# Patient Record
Sex: FEMALE | Race: WHITE | NOT HISPANIC OR LATINO | Employment: OTHER | URBAN - METROPOLITAN AREA
[De-identification: names, ages, dates, MRNs, and addresses within clinical notes are randomized per-mention and may not be internally consistent; named-entity substitution may affect disease eponyms.]

---

## 2017-05-25 ENCOUNTER — CONVERSION ENCOUNTER (OUTPATIENT)
Dept: FAMILY MEDICINE CLINIC | Facility: CLINIC | Age: 32
End: 2017-05-25

## 2017-08-30 ENCOUNTER — CONVERSION ENCOUNTER (OUTPATIENT)
Dept: FAMILY MEDICINE CLINIC | Facility: CLINIC | Age: 32
End: 2017-08-30

## 2017-09-07 ENCOUNTER — HOSPITAL ENCOUNTER (OUTPATIENT)
Dept: FAMILY MEDICINE CLINIC | Facility: CLINIC | Age: 32
Setting detail: SPECIMEN
Discharge: HOME | End: 2017-09-07
Attending: FAMILY MEDICINE | Admitting: FAMILY MEDICINE

## 2017-09-07 ENCOUNTER — CONVERSION ENCOUNTER (OUTPATIENT)
Dept: FAMILY MEDICINE CLINIC | Facility: CLINIC | Age: 32
End: 2017-09-07

## 2017-09-07 LAB
ANION GAP SERPL CALC-SCNC: 9.9 MMOL/L (ref 10–20)
BACTERIA SPEC AEROBE CULT: NORMAL
BASOPHILS # BLD AUTO: 0.1 10*3/UL (ref 0–0.2)
BASOPHILS NFR BLD AUTO: 1 % (ref 0–2)
BILIRUB UR QL STRIP: NEGATIVE MG/DL
BUN SERPL-MCNC: 9 MG/DL (ref 8–20)
BUN/CREAT SERPL: 18 (ref 5.4–26.2)
CALCIUM SERPL-MCNC: 9.4 MG/DL (ref 8.9–10.3)
CASTS URNS QL MICRO: ABNORMAL /[LPF]
CHLORIDE SERPL-SCNC: 105 MMOL/L (ref 101–111)
CHOLEST SERPL-MCNC: 194 MG/DL
CHOLEST/HDLC SERPL: 3.7 {RATIO}
COLOR UR: YELLOW
CONV BACTERIA IN URINE MICRO: ABNORMAL
CONV CLARITY OF URINE: ABNORMAL
CONV CO2: 27 MMOL/L (ref 22–32)
CONV HYALINE CASTS IN URINE MICRO: 1 /[LPF] (ref 0–5)
CONV LDL CHOLESTEROL DIRECT: 136 MG/DL (ref 0–100)
CONV PROTEIN IN URINE BY AUTOMATED TEST STRIP: 30 MG/DL
CONV SMALL ROUND CELLS: ABNORMAL /[HPF]
CONV UROBILINOGEN IN URINE BY AUTOMATED TEST STRIP: 0.2 MG/DL
CREAT UR-MCNC: 0.5 MG/DL (ref 0.4–1)
CULTURE INDICATED?: ABNORMAL
DIFFERENTIAL METHOD BLD: (no result)
EOSINOPHIL # BLD AUTO: 0.3 10*3/UL (ref 0–0.3)
EOSINOPHIL # BLD AUTO: 5 % (ref 0–3)
ERYTHROCYTE [DISTWIDTH] IN BLOOD BY AUTOMATED COUNT: 13.9 % (ref 11.5–14.5)
GLUCOSE SERPL-MCNC: 100 MG/DL (ref 65–99)
GLUCOSE UR QL: NEGATIVE MG/DL
HCT VFR BLD AUTO: 40.5 % (ref 35–49)
HDLC SERPL-MCNC: 53 MG/DL
HGB BLD-MCNC: 13.6 G/DL (ref 12–15)
HGB UR QL STRIP: ABNORMAL
KETONES UR QL STRIP: ABNORMAL MG/DL
LDLC/HDLC SERPL: 2.6 {RATIO}
LEUKOCYTE ESTERASE UR QL STRIP: ABNORMAL
LIPID INTERPRETATION: ABNORMAL
LYMPHOCYTES # BLD AUTO: 1.5 10*3/UL (ref 0.8–4.8)
LYMPHOCYTES NFR BLD AUTO: 26 % (ref 18–42)
Lab: NORMAL
MCH RBC QN AUTO: 29.7 PG (ref 26–32)
MCHC RBC AUTO-ENTMCNC: 33.4 G/DL (ref 32–36)
MCV RBC AUTO: 88.7 FL (ref 80–94)
MICRO REPORT STATUS: NORMAL
MONOCYTES # BLD AUTO: 0.6 10*3/UL (ref 0.1–1.3)
MONOCYTES NFR BLD AUTO: 10 % (ref 2–11)
NEUTROPHILS # BLD AUTO: 3.4 10*3/UL (ref 2.3–8.6)
NEUTROPHILS NFR BLD AUTO: 58 % (ref 50–75)
NITRITE UR QL STRIP: POSITIVE
NRBC BLD AUTO-RTO: 0 /100{WBCS}
NRBC/RBC NFR BLD MANUAL: 0 10*3/UL
PH UR STRIP.AUTO: 6.5 [PH] (ref 4.5–8)
PLATELET # BLD AUTO: 323 10*3/UL (ref 150–450)
PMV BLD AUTO: 9 FL (ref 7.4–10.4)
POTASSIUM SERPL-SCNC: 3.9 MMOL/L (ref 3.6–5.1)
RBC # BLD AUTO: 4.57 10*6/UL (ref 4–5.4)
RBC #/AREA URNS HPF: 4 /[HPF] (ref 0–3)
SODIUM SERPL-SCNC: 138 MMOL/L (ref 136–144)
SP GR UR: 1.02 (ref 1–1.03)
SPECIMEN SOURCE: NORMAL
SPERM URNS QL MICRO: ABNORMAL /[HPF]
SQUAMOUS SPT QL MICRO: 3 /[HPF] (ref 0–5)
TRIGL SERPL-MCNC: 72 MG/DL
UNIDENT CRYS URNS QL MICRO: ABNORMAL /[HPF]
VLDLC SERPL CALC-MCNC: 5.3 MG/DL
WBC # BLD AUTO: 5.8 10*3/UL (ref 4.5–11.5)
WBC #/AREA URNS HPF: ABNORMAL /[HPF] (ref 0–5)
YEAST SPEC QL WET PREP: ABNORMAL /[HPF]

## 2018-02-05 ENCOUNTER — CONVERSION ENCOUNTER (OUTPATIENT)
Dept: FAMILY MEDICINE CLINIC | Facility: CLINIC | Age: 33
End: 2018-02-05

## 2018-09-16 ENCOUNTER — CONVERSION ENCOUNTER (OUTPATIENT)
Dept: FAMILY MEDICINE CLINIC | Facility: CLINIC | Age: 33
End: 2018-09-16

## 2018-12-20 ENCOUNTER — HOSPITAL ENCOUNTER (OUTPATIENT)
Dept: FAMILY MEDICINE CLINIC | Facility: CLINIC | Age: 33
Setting detail: SPECIMEN
Discharge: HOME | End: 2018-12-20
Attending: FAMILY MEDICINE | Admitting: FAMILY MEDICINE

## 2018-12-20 ENCOUNTER — CONVERSION ENCOUNTER (OUTPATIENT)
Dept: FAMILY MEDICINE CLINIC | Facility: CLINIC | Age: 33
End: 2018-12-20

## 2018-12-20 LAB
AMPICILLIN SUSC ISLT: ABNORMAL
AZTREONAM SUSC ISLT: ABNORMAL
BACTERIA ISLT: ABNORMAL
BACTERIA SPEC AEROBE CULT: ABNORMAL
BILIRUB UR QL STRIP: NEGATIVE MG/DL
CASTS URNS QL MICRO: ABNORMAL /[LPF]
CEFAZOLIN SUSC ISLT: ABNORMAL
CEFEPIME SUSC ISLT: ABNORMAL
CEFTRIAXONE SUSC ISLT: ABNORMAL
CIPROFLOXACIN SUSC ISLT: ABNORMAL
COLONY COUNT: ABNORMAL
COLOR UR: ABNORMAL
CONV BACTERIA IN URINE MICRO: ABNORMAL
CONV CLARITY OF URINE: ABNORMAL
CONV HYALINE CASTS IN URINE MICRO: 3 /[LPF] (ref 0–5)
CONV PROTEIN IN URINE BY AUTOMATED TEST STRIP: 30 MG/DL
CONV SMALL ROUND CELLS: ABNORMAL /[HPF]
CONV UROBILINOGEN IN URINE BY AUTOMATED TEST STRIP: 0.2 MG/DL
CULTURE INDICATED?: ABNORMAL
GLUCOSE UR QL: NEGATIVE MG/DL
HGB UR QL STRIP: ABNORMAL
KETONES UR QL STRIP: NEGATIVE MG/DL
LEUKOCYTE ESTERASE UR QL STRIP: ABNORMAL
LEVOFLOXACIN SUSC ISLT: ABNORMAL
Lab: ABNORMAL
MEROPENEM SUSC ISLT: ABNORMAL
MICRO REPORT STATUS: ABNORMAL
NITRITE UR QL STRIP: POSITIVE
NITROFURANTOIN SUSC ISLT: ABNORMAL
PH UR STRIP.AUTO: 6.5 [PH] (ref 4.5–8)
PIP+TAZO SUSC ISLT: ABNORMAL
RBC #/AREA URNS HPF: 10 /[HPF] (ref 0–3)
SP GR UR: 1.03 (ref 1–1.03)
SPECIMEN SOURCE: ABNORMAL
SPERM URNS QL MICRO: ABNORMAL /[HPF]
SQUAMOUS SPT QL MICRO: 4 /[HPF] (ref 0–5)
SUSC METH SPEC: ABNORMAL
TETRACYCLINE SUSC ISLT: ABNORMAL
TOBRAMYCIN SUSC ISLT: ABNORMAL
TRIMETHOPRIM/SULFA: ABNORMAL
UNIDENT CRYS URNS QL MICRO: ABNORMAL /[HPF]
WBC #/AREA URNS HPF: ABNORMAL /[HPF] (ref 0–5)
YEAST SPEC QL WET PREP: ABNORMAL /[HPF]

## 2019-06-04 VITALS
RESPIRATION RATE: 18 BRPM | BODY MASS INDEX: 28.16 KG/M2 | BODY MASS INDEX: 28.35 KG/M2 | DIASTOLIC BLOOD PRESSURE: 86 MMHG | OXYGEN SATURATION: 99 % | WEIGHT: 164 LBS | OXYGEN SATURATION: 98 % | HEIGHT: 63 IN | DIASTOLIC BLOOD PRESSURE: 78 MMHG | OXYGEN SATURATION: 100 % | OXYGEN SATURATION: 99 % | HEART RATE: 63 BPM | SYSTOLIC BLOOD PRESSURE: 117 MMHG | SYSTOLIC BLOOD PRESSURE: 123 MMHG | WEIGHT: 152 LBS | HEART RATE: 77 BPM | HEART RATE: 86 BPM | SYSTOLIC BLOOD PRESSURE: 141 MMHG | RESPIRATION RATE: 20 BRPM | WEIGHT: 153 LBS | HEART RATE: 79 BPM | HEART RATE: 69 BPM | OXYGEN SATURATION: 98 % | HEART RATE: 79 BPM | DIASTOLIC BLOOD PRESSURE: 74 MMHG | BODY MASS INDEX: 27.9 KG/M2 | SYSTOLIC BLOOD PRESSURE: 118 MMHG | RESPIRATION RATE: 16 BRPM | HEIGHT: 62 IN | DIASTOLIC BLOOD PRESSURE: 83 MMHG | WEIGHT: 155 LBS | DIASTOLIC BLOOD PRESSURE: 69 MMHG | SYSTOLIC BLOOD PRESSURE: 137 MMHG | RESPIRATION RATE: 16 BRPM | WEIGHT: 156 LBS | OXYGEN SATURATION: 98 % | WEIGHT: 160 LBS | SYSTOLIC BLOOD PRESSURE: 127 MMHG | RESPIRATION RATE: 16 BRPM | DIASTOLIC BLOOD PRESSURE: 83 MMHG | RESPIRATION RATE: 16 BRPM

## 2019-06-27 ENCOUNTER — TELEPHONE (OUTPATIENT)
Dept: URGENT CARE | Facility: CLINIC | Age: 34
End: 2019-06-27

## 2019-06-27 DIAGNOSIS — H65.06 RECURRENT ACUTE SEROUS OTITIS MEDIA OF BOTH EARS: Primary | ICD-10-CM

## 2019-06-27 RX ORDER — CIPROFLOXACIN 500 MG/1
500 TABLET, FILM COATED ORAL 2 TIMES DAILY
Qty: 14 TABLET | Refills: 0 | Status: SHIPPED | OUTPATIENT
Start: 2019-06-27 | End: 2019-07-04

## 2019-07-12 ENCOUNTER — OFFICE VISIT (OUTPATIENT)
Dept: FAMILY MEDICINE CLINIC | Facility: CLINIC | Age: 34
End: 2019-07-12

## 2019-07-12 VITALS
HEIGHT: 63 IN | OXYGEN SATURATION: 99 % | TEMPERATURE: 97.4 F | SYSTOLIC BLOOD PRESSURE: 121 MMHG | BODY MASS INDEX: 27.11 KG/M2 | WEIGHT: 153 LBS | HEART RATE: 95 BPM | DIASTOLIC BLOOD PRESSURE: 77 MMHG

## 2019-07-12 DIAGNOSIS — B37.2 INTERTRIGINOUS CANDIDIASIS: ICD-10-CM

## 2019-07-12 DIAGNOSIS — H10.31 ACUTE CONJUNCTIVITIS OF RIGHT EYE, UNSPECIFIED ACUTE CONJUNCTIVITIS TYPE: Primary | ICD-10-CM

## 2019-07-12 PROBLEM — Z00.00 ENCOUNTER FOR GENERAL ADULT MEDICAL EXAMINATION WITHOUT ABNORMAL FINDINGS: Status: ACTIVE | Noted: 2018-12-20

## 2019-07-12 PROCEDURE — 99214 OFFICE O/P EST MOD 30 MIN: CPT | Performed by: FAMILY MEDICINE

## 2019-07-12 RX ORDER — NYSTATIN 100000 U/G
OINTMENT TOPICAL 2 TIMES DAILY
Qty: 30 G | Refills: 1 | Status: SHIPPED | OUTPATIENT
Start: 2019-07-12 | End: 2021-04-20

## 2019-07-12 RX ORDER — TOBRAMYCIN 3 MG/ML
1 SOLUTION/ DROPS OPHTHALMIC 4 TIMES DAILY
Qty: 2 ML | Refills: 0 | Status: SHIPPED | OUTPATIENT
Start: 2019-07-12 | End: 2019-07-19

## 2019-07-12 NOTE — PROGRESS NOTES
Subjective   Chief Complaint   Patient presents with   • Eye Drainage     rt eye x this am   • Rash     Vivian Portillo is a 33 y.o. female.     Patient Care Team:  Temi Christianson MD as PCP - General (Family Medicine)    She is coming in today with her mother to discuss couple of issues.  First of all she wants to talk about the rash she noted in her both groin areas about 2 weeks ago.  Mother was applying some over-the-counter lotions but is not really helping.  She has issues with incontinence and she wears pull-ups and complains about more sweating in that area.  She denies any drainage or any fever.  Her mother also wants to address her right eye symptoms.  She reports that today in the morning when she woke up her right eye it was mattered and she saw yellow drainage.  Now her eye is red and she complains about some discomfort, but she denies any visual problems.  She denies any upper respiratory symptoms like cough, congestion, drainage, or ear ache.         The following portions of the patient's history were reviewed and updated as appropriate: allergies, current medications, past family history, past medical history, past social history, past surgical history and problem list.  Past Medical History:   Diagnosis Date   • Anemia    • Astigmatism    • Cerebral palsy (CMS/HCC)    • Constipation    • Vitamin D deficiency      Past Surgical History:   Procedure Laterality Date   • DENTAL PROCEDURE      wisdom teeth   • EYE SURGERY      reattached muscles, cross eyed     The patient has a family history of  Family History   Problem Relation Age of Onset   • Hypotension Mother    • Hypertension Father    • Diabetes Maternal Grandmother    • Heart disease Maternal Grandmother    • Heart failure Maternal Grandmother    • Breast cancer Maternal Grandmother    • Cancer Maternal Grandmother    • Asthma Paternal Grandfather      Social History     Socioeconomic History   • Marital status: Single     Spouse name: Not on  "file   • Number of children: Not on file   • Years of education: Not on file   • Highest education level: Not on file   Tobacco Use   • Smoking status: Never Smoker   • Smokeless tobacco: Never Used   Substance and Sexual Activity   • Alcohol use: No     Frequency: Never   • Drug use: Defer   • Sexual activity: Defer       Review of Systems   Constitutional: Negative for chills, fatigue and fever.   HENT: Negative for congestion, ear pain, facial swelling, mouth sores, postnasal drip, sinus pressure, sneezing and sore throat.    Eyes: Positive for discharge and redness. Negative for blurred vision, double vision, photophobia, pain, itching and visual disturbance.   Skin: Positive for rash. Negative for dry skin, skin lesions and bruise.   Hematological: Negative for adenopathy. Does not bruise/bleed easily.     Visit Vitals  /77 (BP Location: Left arm, Patient Position: Sitting, Cuff Size: Adult)   Pulse 95   Temp 97.4 °F (36.3 °C) (Oral)   Ht 158.8 cm (62.5\")   Wt 69.4 kg (153 lb)   LMP 06/18/2019   SpO2 99%   BMI 27.54 kg/m²       Current Outpatient Medications:   •  nystatin (MYCOSTATIN) 832045 UNIT/GM ointment, Apply  topically to the appropriate area as directed 2 (Two) Times a Day., Disp: 30 g, Rfl: 1  •  Polyethylene Glycol 3350 (MIRALAX PO), MIRALAX POWD, Disp: , Rfl:   •  tobramycin 0.3 % solution ophthalmic solution, Administer 1 drop to both eyes 4 (Four) Times a Day for 7 days., Disp: 2 mL, Rfl: 0  •  Vitamin D, Cholecalciferol, (CHOLECALCIFEROL) 400 units tablet, Take  by mouth Daily., Disp: , Rfl:     Objective   Physical Exam   Constitutional: She is oriented to person, place, and time. She appears well-developed and well-nourished.   HENT:   Head: Normocephalic and atraumatic.   Eyes: EOM are normal. Pupils are equal, round, and reactive to light. Right eye exhibits discharge. Left eye exhibits no discharge. No scleral icterus.   Neck: Normal range of motion. Neck supple.   Cardiovascular: " Normal rate, regular rhythm, normal heart sounds and intact distal pulses. Exam reveals no gallop.   No murmur heard.  Pulmonary/Chest: Effort normal and breath sounds normal. No respiratory distress. She has no rales. She exhibits no tenderness.   Musculoskeletal: She exhibits no deformity.   Neurological: She is alert and oriented to person, place, and time.   Skin: Skin is warm and dry.   There is some skin irritation and inflammation noted in both groin areas.   Nursing note and vitals reviewed.              Assessment/Plan   Problems Addressed this Visit        Musculoskeletal and Integument    Intertriginous candidiasis    Relevant Medications    nystatin (MYCOSTATIN) 624160 UNIT/GM ointment       Other    Acute conjunctivitis of right eye - Primary    Relevant Medications    tobramycin 0.3 % solution ophthalmic solution        If it comes to her skin issues I will start her on topical antifungal.  Skin care was discussed.  Her mother is to call us back next week if no improvement.  If it comes to her right eye issues this is probably due to mild infection which is very early on.  I will start her on eyedrops and she was advised to use those in both eyes to prevent spreading of the infection.  Precautions were also discussed.       Requested Prescriptions     Signed Prescriptions Disp Refills   • tobramycin 0.3 % solution ophthalmic solution 2 mL 0     Sig: Administer 1 drop to both eyes 4 (Four) Times a Day for 7 days.   • nystatin (MYCOSTATIN) 655938 UNIT/GM ointment 30 g 1     Sig: Apply  topically to the appropriate area as directed 2 (Two) Times a Day.

## 2019-10-25 DIAGNOSIS — Z00.00 ENCOUNTER FOR GENERAL ADULT MEDICAL EXAMINATION WITHOUT ABNORMAL FINDINGS: Primary | ICD-10-CM

## 2019-10-25 LAB
ALBUMIN SERPL-MCNC: 4.5 G/DL (ref 3.5–5.2)
ALBUMIN/GLOB SERPL: 1.3 G/DL
ALP SERPL-CCNC: 69 U/L (ref 39–117)
ALT SERPL W P-5'-P-CCNC: 19 U/L (ref 1–33)
ANION GAP SERPL CALCULATED.3IONS-SCNC: 6.8 MMOL/L (ref 5–15)
AST SERPL-CCNC: 12 U/L (ref 1–32)
BASOPHILS # BLD AUTO: 0.09 10*3/MM3 (ref 0–0.2)
BASOPHILS NFR BLD AUTO: 1.6 % (ref 0–1.5)
BILIRUB SERPL-MCNC: 0.7 MG/DL (ref 0.2–1.2)
BUN BLD-MCNC: 8 MG/DL (ref 6–20)
BUN/CREAT SERPL: 13.6 (ref 7–25)
CALCIUM SPEC-SCNC: 9.4 MG/DL (ref 8.6–10.5)
CHLORIDE SERPL-SCNC: 103 MMOL/L (ref 98–107)
CHOLEST SERPL-MCNC: 161 MG/DL (ref 0–200)
CO2 SERPL-SCNC: 31.2 MMOL/L (ref 22–29)
CREAT BLD-MCNC: 0.59 MG/DL (ref 0.57–1)
DEPRECATED RDW RBC AUTO: 40.7 FL (ref 37–54)
EOSINOPHIL # BLD AUTO: 0.15 10*3/MM3 (ref 0–0.4)
EOSINOPHIL NFR BLD AUTO: 2.6 % (ref 0.3–6.2)
ERYTHROCYTE [DISTWIDTH] IN BLOOD BY AUTOMATED COUNT: 13 % (ref 12.3–15.4)
GFR SERPL CREATININE-BSD FRML MDRD: 118 ML/MIN/1.73
GLOBULIN UR ELPH-MCNC: 3.4 GM/DL
GLUCOSE BLD-MCNC: 98 MG/DL (ref 65–99)
HCT VFR BLD AUTO: 39.4 % (ref 34–46.6)
HDLC SERPL-MCNC: 48 MG/DL (ref 40–60)
HGB BLD-MCNC: 13.2 G/DL (ref 12–15.9)
IMM GRANULOCYTES # BLD AUTO: 0.02 10*3/MM3 (ref 0–0.05)
IMM GRANULOCYTES NFR BLD AUTO: 0.4 % (ref 0–0.5)
LDLC SERPL CALC-MCNC: 98 MG/DL (ref 0–100)
LDLC/HDLC SERPL: 2.03 {RATIO}
LYMPHOCYTES # BLD AUTO: 1.68 10*3/MM3 (ref 0.7–3.1)
LYMPHOCYTES NFR BLD AUTO: 29.4 % (ref 19.6–45.3)
MCH RBC QN AUTO: 29.1 PG (ref 26.6–33)
MCHC RBC AUTO-ENTMCNC: 33.5 G/DL (ref 31.5–35.7)
MCV RBC AUTO: 87 FL (ref 79–97)
MONOCYTES # BLD AUTO: 0.55 10*3/MM3 (ref 0.1–0.9)
MONOCYTES NFR BLD AUTO: 9.6 % (ref 5–12)
NEUTROPHILS # BLD AUTO: 3.22 10*3/MM3 (ref 1.7–7)
NEUTROPHILS NFR BLD AUTO: 56.4 % (ref 42.7–76)
NRBC BLD AUTO-RTO: 0 /100 WBC (ref 0–0.2)
PLATELET # BLD AUTO: 361 10*3/MM3 (ref 140–450)
PMV BLD AUTO: 10.7 FL (ref 6–12)
POTASSIUM BLD-SCNC: 4.1 MMOL/L (ref 3.5–5.2)
PROT SERPL-MCNC: 7.9 G/DL (ref 6–8.5)
RBC # BLD AUTO: 4.53 10*6/MM3 (ref 3.77–5.28)
SODIUM BLD-SCNC: 141 MMOL/L (ref 136–145)
TRIGL SERPL-MCNC: 77 MG/DL (ref 0–150)
TSH SERPL DL<=0.05 MIU/L-ACNC: 2.41 UIU/ML (ref 0.27–4.2)
VLDLC SERPL-MCNC: 15.4 MG/DL (ref 5–40)
WBC NRBC COR # BLD: 5.71 10*3/MM3 (ref 3.4–10.8)

## 2019-10-25 PROCEDURE — 85025 COMPLETE CBC W/AUTO DIFF WBC: CPT | Performed by: FAMILY MEDICINE

## 2019-10-25 PROCEDURE — 80053 COMPREHEN METABOLIC PANEL: CPT | Performed by: FAMILY MEDICINE

## 2019-10-25 PROCEDURE — 80061 LIPID PANEL: CPT | Performed by: FAMILY MEDICINE

## 2019-10-25 PROCEDURE — 36415 COLL VENOUS BLD VENIPUNCTURE: CPT | Performed by: FAMILY MEDICINE

## 2019-10-25 PROCEDURE — 84443 ASSAY THYROID STIM HORMONE: CPT | Performed by: FAMILY MEDICINE

## 2019-10-28 ENCOUNTER — OFFICE VISIT (OUTPATIENT)
Dept: FAMILY MEDICINE CLINIC | Facility: CLINIC | Age: 34
End: 2019-10-28

## 2019-10-28 VITALS
WEIGHT: 157 LBS | SYSTOLIC BLOOD PRESSURE: 113 MMHG | HEART RATE: 80 BPM | TEMPERATURE: 97.7 F | DIASTOLIC BLOOD PRESSURE: 74 MMHG | OXYGEN SATURATION: 97 % | BODY MASS INDEX: 28.26 KG/M2

## 2019-10-28 DIAGNOSIS — R25.2 LEG CRAMPING: Primary | ICD-10-CM

## 2019-10-28 DIAGNOSIS — K59.09 CONSTIPATION, CHRONIC: ICD-10-CM

## 2019-10-28 PROBLEM — H10.31 ACUTE CONJUNCTIVITIS OF RIGHT EYE: Status: RESOLVED | Noted: 2019-07-12 | Resolved: 2019-10-28

## 2019-10-28 PROCEDURE — 99213 OFFICE O/P EST LOW 20 MIN: CPT | Performed by: FAMILY MEDICINE

## 2019-10-28 NOTE — PROGRESS NOTES
Subjective   Chief Complaint   Patient presents with   • Leg Pain     Vivian Portillo is a 34 y.o. female.     Patient Care Team:  Temi Christianson MD as PCP - General (Family Medicine)    She is coming today with her mother due to some pain and cramping in her right thigh on and off for over a month.  That started getting better and then last week she developed some cramping on the right side of her abdomen.  Her mother started giving her some mineral supplement and also Gatorade to drink and she has been getting better since then.  She has history of constipation and takes over-the-counter MiraLAX.  Her mother recently added a fiber supplement.  On further questioning she reports that the girl mainly sits throughout the day and does not move around too much.  Lately they have been trying to take some walks and that seems to be helping as well.         The following portions of the patient's history were reviewed and updated as appropriate: allergies, current medications, past family history, past medical history, past social history, past surgical history and problem list.  Past Medical History:   Diagnosis Date   • Anemia    • Astigmatism    • Cerebral palsy (CMS/HCC)    • Constipation    • Vitamin D deficiency      Past Surgical History:   Procedure Laterality Date   • DENTAL PROCEDURE      wisdom teeth   • EYE SURGERY      reattached muscles, cross eyed     The patient has a family history of  Family History   Problem Relation Age of Onset   • Hypotension Mother    • Hypertension Father    • Diabetes Maternal Grandmother    • Heart disease Maternal Grandmother    • Heart failure Maternal Grandmother    • Breast cancer Maternal Grandmother    • Cancer Maternal Grandmother    • Asthma Paternal Grandfather      Social History     Socioeconomic History   • Marital status: Single     Spouse name: Not on file   • Number of children: Not on file   • Years of education: Not on file   • Highest education level: Not on  file   Tobacco Use   • Smoking status: Never Smoker   • Smokeless tobacco: Never Used   Substance and Sexual Activity   • Alcohol use: No     Frequency: Never   • Drug use: Defer   • Sexual activity: Defer       Review of Systems   Constitutional: Negative for fatigue and fever.   Gastrointestinal: Positive for constipation. Negative for abdominal pain, nausea, vomiting, GERD and indigestion.   Musculoskeletal: Negative for arthralgias, back pain, gait problem and bursitis.   Neurological: Negative for tremors, weakness and numbness.     Visit Vitals  /74 (BP Location: Left arm, Patient Position: Sitting, Cuff Size: Adult)   Pulse 80   Temp 97.7 °F (36.5 °C) (Oral)   Wt 71.2 kg (157 lb)   SpO2 97%   BMI 28.26 kg/m²       Current Outpatient Medications:   •  nystatin (MYCOSTATIN) [id number] UNIT/GM ointment, Apply  topically to the appropriate area as directed 2 (Two) Times a Day., Disp: 30 g, Rfl: 1  •  Polyethylene Glycol 3350 (MIRALAX PO), MIRALAX POWD, Disp: , Rfl:   •  Vitamin D, Cholecalciferol, (CHOLECALCIFEROL) 400 units tablet, Take  by mouth Daily., Disp: , Rfl:     Objective   Physical Exam   Constitutional: She is oriented to person, place, and time. She appears well-developed and well-nourished.   HENT:   Head: Normocephalic and atraumatic.   Eyes: Conjunctivae and EOM are normal. Pupils are equal, round, and reactive to light.   Neck: Normal range of motion. Neck supple.   Cardiovascular: Normal rate, regular rhythm, normal heart sounds and intact distal pulses. Exam reveals no gallop.   No murmur heard.  Pulmonary/Chest: Effort normal and breath sounds normal. No respiratory distress. She has no rales. She exhibits no tenderness.   Abdominal: Soft. Bowel sounds are normal. She exhibits no distension and no mass. There is no tenderness. There is no rebound and no guarding.   Musculoskeletal: Normal range of motion. She exhibits no edema or deformity.   Neurological: She is alert and oriented to  person, place, and time.   Skin: Skin is warm and dry.   Nursing note and vitals reviewed.           Orders Only on 10/25/2019   Component Date Value Ref Range Status   • Glucose 10/25/2019 98  65 - 99 mg/dL Final   • BUN 10/25/2019 8  6 - 20 mg/dL Final   • Creatinine 10/25/2019 0.59  0.57 - 1.00 mg/dL Final   • Sodium 10/25/2019 141  136 - 145 mmol/L Final   • Potassium 10/25/2019 4.1  3.5 - 5.2 mmol/L Final   • Chloride 10/25/2019 103  98 - 107 mmol/L Final   • CO2 10/25/2019 31.2* 22.0 - 29.0 mmol/L Final   • Calcium 10/25/2019 9.4  8.6 - 10.5 mg/dL Final   • Total Protein 10/25/2019 7.9  6.0 - 8.5 g/dL Final   • Albumin 10/25/2019 4.50  3.50 - 5.20 g/dL Final   • ALT (SGPT) 10/25/2019 19  1 - 33 U/L Final   • AST (SGOT) 10/25/2019 12  1 - 32 U/L Final   • Alkaline Phosphatase 10/25/2019 69  39 - 117 U/L Final   • Total Bilirubin 10/25/2019 0.7  0.2 - 1.2 mg/dL Final   • eGFR Non African Amer 10/25/2019 118  >60 mL/min/1.73 Final   • Globulin 10/25/2019 3.4  gm/dL Final   • A/G Ratio 10/25/2019 1.3  g/dL Final   • BUN/Creatinine Ratio 10/25/2019 13.6  7.0 - 25.0 Final   • Anion Gap 10/25/2019 6.8  5.0 - 15.0 mmol/L Final   • WBC 10/25/2019 5.71  3.40 - 10.80 10*3/mm3 Final   • RBC 10/25/2019 4.53  3.77 - 5.28 10*6/mm3 Final   • Hemoglobin 10/25/2019 13.2  12.0 - 15.9 g/dL Final   • Hematocrit 10/25/2019 39.4  34.0 - 46.6 % Final   • MCV 10/25/2019 87.0  79.0 - 97.0 fL Final   • MCH 10/25/2019 29.1  26.6 - 33.0 pg Final   • MCHC 10/25/2019 33.5  31.5 - 35.7 g/dL Final   • RDW 10/25/2019 13.0  12.3 - 15.4 % Final   • RDW-SD 10/25/2019 40.7  37.0 - 54.0 fl Final   • MPV 10/25/2019 10.7  6.0 - 12.0 fL Final   • Platelets 10/25/2019 361  140 - 450 10*3/mm3 Final   • Neutrophil % 10/25/2019 56.4  42.7 - 76.0 % Final   • Lymphocyte % 10/25/2019 29.4  19.6 - 45.3 % Final   • Monocyte % 10/25/2019 9.6  5.0 - 12.0 % Final   • Eosinophil % 10/25/2019 2.6  0.3 - 6.2 % Final   • Basophil % 10/25/2019 1.6* 0.0 - 1.5 % Final    • Immature Grans % 10/25/2019 0.4  0.0 - 0.5 % Final   • Neutrophils, Absolute 10/25/2019 3.22  1.70 - 7.00 10*3/mm3 Final   • Lymphocytes, Absolute 10/25/2019 1.68  0.70 - 3.10 10*3/mm3 Final   • Monocytes, Absolute 10/25/2019 0.55  0.10 - 0.90 10*3/mm3 Final   • Eosinophils, Absolute 10/25/2019 0.15  0.00 - 0.40 10*3/mm3 Final   • Basophils, Absolute 10/25/2019 0.09  0.00 - 0.20 10*3/mm3 Final   • Immature Grans, Absolute 10/25/2019 0.02  0.00 - 0.05 10*3/mm3 Final   • nRBC 10/25/2019 0.0  0.0 - 0.2 /100 WBC Final   • Total Cholesterol 10/25/2019 161  0 - 200 mg/dL Final   • Triglycerides 10/25/2019 77  0 - 150 mg/dL Final   • HDL Cholesterol 10/25/2019 48  40 - 60 mg/dL Final   • LDL Cholesterol  10/25/2019 98  0 - 100 mg/dL Final   • VLDL Cholesterol 10/25/2019 15.4  5 - 40 mg/dL Final   • LDL/HDL Ratio 10/25/2019 2.03   Final   • TSH 10/25/2019 2.410  0.270 - 4.200 uIU/mL Final         Lab Results   Component Value Date    BUN 8 10/25/2019    CREATININE 0.59 10/25/2019    EGFRIFNONA 118 10/25/2019     10/25/2019    K 4.1 10/25/2019     10/25/2019    CALCIUM 9.4 10/25/2019    ALBUMIN 4.50 10/25/2019    BILITOT 0.7 10/25/2019    ALKPHOS 69 10/25/2019    AST 12 10/25/2019    ALT 19 10/25/2019    TRIG 77 10/25/2019    HDL 48 10/25/2019    VLDL 15.4 10/25/2019    LDL 98 10/25/2019    LDLHDL 2.03 10/25/2019    WBC 5.71 10/25/2019    RBC 4.53 10/25/2019    HCT 39.4 10/25/2019    MCV 87.0 10/25/2019    MCH 29.1 10/25/2019    TSH 2.410 10/25/2019          Assessment/Plan   Problems Addressed this Visit        Digestive    Constipation, chronic       Musculoskeletal and Integument    Leg cramping - Primary        Her exam today is intact.  I have reviewed her recent labs and they look good including her calcium and potassium levels.  We talked at length about the importance of good hydration.  Some of her symptoms might be due to her sedentary style of life.   she was encouraged to be more active.  If it  comes to her constipation she will continue MiraLAX and fiber supplement.  They are to call us back if any worsening.             Requested Prescriptions      No prescriptions requested or ordered in this encounter

## 2019-11-11 ENCOUNTER — HOSPITAL ENCOUNTER (EMERGENCY)
Facility: HOSPITAL | Age: 34
Discharge: HOME | End: 2019-11-11
Attending: EMERGENCY MEDICINE | Admitting: EMERGENCY MEDICINE

## 2019-11-11 ENCOUNTER — APPOINTMENT (OUTPATIENT)
Dept: CT IMAGING | Facility: HOSPITAL | Age: 34
End: 2019-11-11

## 2019-11-11 VITALS
DIASTOLIC BLOOD PRESSURE: 75 MMHG | SYSTOLIC BLOOD PRESSURE: 126 MMHG | WEIGHT: 152.34 LBS | BODY MASS INDEX: 28.03 KG/M2 | HEIGHT: 62 IN | TEMPERATURE: 98 F | RESPIRATION RATE: 14 BRPM | OXYGEN SATURATION: 98 % | HEART RATE: 74 BPM

## 2019-11-11 DIAGNOSIS — S13.9XXA ACUTE CERVICAL SPRAIN, INITIAL ENCOUNTER: Primary | ICD-10-CM

## 2019-11-11 PROCEDURE — 99283 EMERGENCY DEPT VISIT LOW MDM: CPT

## 2019-11-11 PROCEDURE — 72125 CT NECK SPINE W/O DYE: CPT

## 2019-11-11 PROCEDURE — 70450 CT HEAD/BRAIN W/O DYE: CPT

## 2019-11-11 RX ORDER — ONDANSETRON 4 MG/1
8 TABLET, ORALLY DISINTEGRATING ORAL ONCE
Status: COMPLETED | OUTPATIENT
Start: 2019-11-11 | End: 2019-11-11

## 2019-11-11 RX ORDER — CYCLOBENZAPRINE HCL 5 MG
5 TABLET ORAL 3 TIMES DAILY PRN
Qty: 15 TABLET | Refills: 0 | Status: SHIPPED | OUTPATIENT
Start: 2019-11-11 | End: 2020-02-19

## 2019-11-11 RX ORDER — IBUPROFEN 600 MG/1
600 TABLET ORAL EVERY 6 HOURS PRN
Qty: 30 TABLET | Refills: 0 | Status: SHIPPED | OUTPATIENT
Start: 2019-11-11

## 2019-11-11 RX ADMIN — ONDANSETRON 8 MG: 4 TABLET, ORALLY DISINTEGRATING ORAL at 15:48
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
## 2019-11-12 NOTE — DISCHARGE INSTRUCTIONS
Rest next 24 hours  No heavy lifting or pulling for the next 3 days  Medication as directed  Follow-up with your primary care provider   1 person assist/hand placement and technique for safety/verbal cues
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
## 2020-02-19 ENCOUNTER — OFFICE VISIT (OUTPATIENT)
Dept: FAMILY MEDICINE CLINIC | Facility: CLINIC | Age: 35
End: 2020-02-19

## 2020-02-19 VITALS
TEMPERATURE: 97.6 F | DIASTOLIC BLOOD PRESSURE: 84 MMHG | BODY MASS INDEX: 28.35 KG/M2 | HEART RATE: 73 BPM | SYSTOLIC BLOOD PRESSURE: 127 MMHG | WEIGHT: 160 LBS | RESPIRATION RATE: 16 BRPM | OXYGEN SATURATION: 98 % | HEIGHT: 63 IN

## 2020-02-19 DIAGNOSIS — H10.31 ACUTE CONJUNCTIVITIS OF RIGHT EYE, UNSPECIFIED ACUTE CONJUNCTIVITIS TYPE: ICD-10-CM

## 2020-02-19 DIAGNOSIS — L03.012 PARONYCHIA OF LEFT INDEX FINGER: Primary | ICD-10-CM

## 2020-02-19 DIAGNOSIS — H92.01 RIGHT EAR PAIN: ICD-10-CM

## 2020-02-19 PROBLEM — R25.2 LEG CRAMPING: Status: RESOLVED | Noted: 2019-10-28 | Resolved: 2020-02-19

## 2020-02-19 PROCEDURE — 99214 OFFICE O/P EST MOD 30 MIN: CPT | Performed by: FAMILY MEDICINE

## 2020-02-19 RX ORDER — DOXYCYCLINE HYCLATE 100 MG
100 TABLET ORAL 2 TIMES DAILY
Qty: 20 TABLET | Refills: 0 | Status: SHIPPED | OUTPATIENT
Start: 2020-02-19 | End: 2020-02-29

## 2020-02-19 NOTE — PROGRESS NOTES
Subjective   Chief Complaint   Patient presents with   • Hand Pain     Left index   • Earache   • Conjunctivitis     Vivian Portillo is a 34 y.o. female.     Patient Care Team:  Temi Christianson MD as PCP - General (Family Medicine)    She is coming in today with her mom as she wants to talk about few of her problems.  First of all her mom is concerned about her recurrent right eye problems.  She reports that for the last few may be even several months she has had recurrence of redness and drainage from the right eye.  She was seen here few months ago for that and then repeatedly treated by her eye doctor over the phone with eyedrops.  However they were not able to schedule appointment there.  She was also recently seen for the same at urgent care.  She recently started complaining about the pain in the right ear and the right side of the face.  However no cough or congestion reported.  No fever or upper respiratory problems.  She has some issues with allergies and mom gives her periodically some allergy medications.  The girl has cerebral palsy and is mentally challenged and the majority of the history today is provided by her mom.  She also wants to talk about the issues with her left index finger, which has been red and swollen for the last few days and it is causing some discomfort.  No drainage reported.       The following portions of the patient's history were reviewed and updated as appropriate: allergies, current medications, past family history, past medical history, past social history, past surgical history and problem list.  Past Medical History:   Diagnosis Date   • Anemia    • Astigmatism    • Cerebral palsy (CMS/HCC)    • Constipation    • Vitamin D deficiency      Past Surgical History:   Procedure Laterality Date   • DENTAL PROCEDURE      wisdom teeth   • EYE SURGERY      reattached muscles, cross eyed     The patient has a family history of  Family History   Problem Relation Age of Onset   •  "Hypotension Mother    • Hypertension Father    • Diabetes Maternal Grandmother    • Heart disease Maternal Grandmother    • Heart failure Maternal Grandmother    • Breast cancer Maternal Grandmother    • Cancer Maternal Grandmother    • Asthma Paternal Grandfather      Social History     Socioeconomic History   • Marital status: Single     Spouse name: Not on file   • Number of children: Not on file   • Years of education: Not on file   • Highest education level: Not on file   Tobacco Use   • Smoking status: Never Smoker   • Smokeless tobacco: Never Used   Substance and Sexual Activity   • Alcohol use: No     Frequency: Never   • Drug use: Defer   • Sexual activity: Defer       Review of Systems   Constitutional: Negative for activity change, appetite change, chills and fever.   HENT: Positive for congestion, postnasal drip and sore throat. Negative for ear discharge, ear pain, sinus pressure and swollen glands.    Eyes: Positive for discharge and redness. Negative for itching.   Respiratory: Negative for cough, choking, chest tightness, shortness of breath, wheezing and stridor.    Cardiovascular: Negative for chest pain.   Skin: Positive for color change. Negative for dry skin and rash.   Allergic/Immunologic: Positive for environmental allergies. Negative for food allergies.     Visit Vitals  /84 (BP Location: Right arm, Patient Position: Sitting, Cuff Size: Large Adult)   Pulse 73   Temp 97.6 °F (36.4 °C) (Oral)   Resp 16   Ht 160 cm (63\")   Wt 72.6 kg (160 lb)   SpO2 98%   BMI 28.34 kg/m²       Current Outpatient Medications:   •  doxycycline (VIBRAMYICN) 100 MG tablet, Take 1 tablet by mouth 2 (Two) Times a Day for 10 days., Disp: 20 tablet, Rfl: 0  •  ibuprofen (ADVIL,MOTRIN) 600 MG tablet, Take 1 tablet by mouth Every 6 (Six) Hours As Needed for Mild Pain ., Disp: 30 tablet, Rfl: 0  •  nystatin (MYCOSTATIN) 753377 UNIT/GM ointment, Apply  topically to the appropriate area as directed 2 (Two) Times a " Day., Disp: 30 g, Rfl: 1  •  Polyethylene Glycol 3350 (MIRALAX PO), MIRALAX POWD, Disp: , Rfl:   •  Vitamin D, Cholecalciferol, (CHOLECALCIFEROL) 400 units tablet, Take  by mouth Daily., Disp: , Rfl:     Objective   Physical Exam   Constitutional: She appears well-developed and well-nourished. No distress.   HENT:   Head: Normocephalic and atraumatic.   Right Ear: External ear normal.   Left Ear: External ear normal.   Mouth/Throat: Oropharynx is clear and moist. No oropharyngeal exudate.   Congestion noted.   Eyes: Pupils are equal, round, and reactive to light. Conjunctivae and EOM are normal.   Some mild right eye conjunctival redness noted, no drainage.   Neck: Normal range of motion. Neck supple.   Cardiovascular: Normal rate, regular rhythm, normal heart sounds and intact distal pulses.   Pulmonary/Chest: Effort normal and breath sounds normal. No respiratory distress. She has no wheezes. She has no rales.   Musculoskeletal:   There is redness and swelling noted around the nail of the left index finger.  Area is tender to touch.  No pus collection noted, no drainage.   Skin: Skin is warm and dry. No rash noted.   Nursing note and vitals reviewed.           No visits with results within 7 Day(s) from this visit.   Latest known visit with results is:   Orders Only on 10/25/2019   Component Date Value Ref Range Status   • Glucose 10/25/2019 98  65 - 99 mg/dL Final   • BUN 10/25/2019 8  6 - 20 mg/dL Final   • Creatinine 10/25/2019 0.59  0.57 - 1.00 mg/dL Final   • Sodium 10/25/2019 141  136 - 145 mmol/L Final   • Potassium 10/25/2019 4.1  3.5 - 5.2 mmol/L Final   • Chloride 10/25/2019 103  98 - 107 mmol/L Final   • CO2 10/25/2019 31.2* 22.0 - 29.0 mmol/L Final   • Calcium 10/25/2019 9.4  8.6 - 10.5 mg/dL Final   • Total Protein 10/25/2019 7.9  6.0 - 8.5 g/dL Final   • Albumin 10/25/2019 4.50  3.50 - 5.20 g/dL Final   • ALT (SGPT) 10/25/2019 19  1 - 33 U/L Final   • AST (SGOT) 10/25/2019 12  1 - 32 U/L Final   •  Alkaline Phosphatase 10/25/2019 69  39 - 117 U/L Final   • Total Bilirubin 10/25/2019 0.7  0.2 - 1.2 mg/dL Final   • eGFR Non African Amer 10/25/2019 118  >60 mL/min/1.73 Final   • Globulin 10/25/2019 3.4  gm/dL Final   • A/G Ratio 10/25/2019 1.3  g/dL Final   • BUN/Creatinine Ratio 10/25/2019 13.6  7.0 - 25.0 Final   • Anion Gap 10/25/2019 6.8  5.0 - 15.0 mmol/L Final   • WBC 10/25/2019 5.71  3.40 - 10.80 10*3/mm3 Final   • RBC 10/25/2019 4.53  3.77 - 5.28 10*6/mm3 Final   • Hemoglobin 10/25/2019 13.2  12.0 - 15.9 g/dL Final   • Hematocrit 10/25/2019 39.4  34.0 - 46.6 % Final   • MCV 10/25/2019 87.0  79.0 - 97.0 fL Final   • MCH 10/25/2019 29.1  26.6 - 33.0 pg Final   • MCHC 10/25/2019 33.5  31.5 - 35.7 g/dL Final   • RDW 10/25/2019 13.0  12.3 - 15.4 % Final   • RDW-SD 10/25/2019 40.7  37.0 - 54.0 fl Final   • MPV 10/25/2019 10.7  6.0 - 12.0 fL Final   • Platelets 10/25/2019 361  140 - 450 10*3/mm3 Final   • Neutrophil % 10/25/2019 56.4  42.7 - 76.0 % Final   • Lymphocyte % 10/25/2019 29.4  19.6 - 45.3 % Final   • Monocyte % 10/25/2019 9.6  5.0 - 12.0 % Final   • Eosinophil % 10/25/2019 2.6  0.3 - 6.2 % Final   • Basophil % 10/25/2019 1.6* 0.0 - 1.5 % Final   • Immature Grans % 10/25/2019 0.4  0.0 - 0.5 % Final   • Neutrophils, Absolute 10/25/2019 3.22  1.70 - 7.00 10*3/mm3 Final   • Lymphocytes, Absolute 10/25/2019 1.68  0.70 - 3.10 10*3/mm3 Final   • Monocytes, Absolute 10/25/2019 0.55  0.10 - 0.90 10*3/mm3 Final   • Eosinophils, Absolute 10/25/2019 0.15  0.00 - 0.40 10*3/mm3 Final   • Basophils, Absolute 10/25/2019 0.09  0.00 - 0.20 10*3/mm3 Final   • Immature Grans, Absolute 10/25/2019 0.02  0.00 - 0.05 10*3/mm3 Final   • nRBC 10/25/2019 0.0  0.0 - 0.2 /100 WBC Final   • Total Cholesterol 10/25/2019 161  0 - 200 mg/dL Final   • Triglycerides 10/25/2019 77  0 - 150 mg/dL Final   • HDL Cholesterol 10/25/2019 48  40 - 60 mg/dL Final   • LDL Cholesterol  10/25/2019 98  0 - 100 mg/dL Final   • VLDL Cholesterol  10/25/2019 15.4  5 - 40 mg/dL Final   • LDL/HDL Ratio 10/25/2019 2.03   Final   • TSH 10/25/2019 2.410  0.270 - 4.200 uIU/mL Final                 Assessment/Plan   Problems Addressed this Visit        Nervous and Auditory    Right ear pain       Musculoskeletal and Integument    Paronychia of left index finger - Primary    Relevant Medications    doxycycline (VIBRAMYICN) 100 MG tablet       Other    Acute conjunctivitis of right eye        If it comes to her recurrent right eye issues she was already treated for this here few months ago, but she was not seen by her eye doctor yet and apparently just prescriptions were called in.  I advised her mom to actually call and make an appointment for her to get evaluated by her eye doctor due to recurrent right eye issues.  Her right ear exam today is intact.  Her symptoms possibly might be due to mild upper respiratory infection or possibly due to TMJ.  Symptomatic treatment was discussed and recommended.  If it comes to her left index finger symptoms, this is consistent with paronychia and I will be starting her on antibiotic.  They will monitor the symptoms and call us back if any concerns.             Requested Prescriptions     Signed Prescriptions Disp Refills   • doxycycline (VIBRAMYICN) 100 MG tablet 20 tablet 0     Sig: Take 1 tablet by mouth 2 (Two) Times a Day for 10 days.

## 2020-03-18 ENCOUNTER — TELEPHONE (OUTPATIENT)
Dept: FAMILY MEDICINE CLINIC | Facility: CLINIC | Age: 35
End: 2020-03-18

## 2020-03-18 NOTE — TELEPHONE ENCOUNTER
Patients mother called and was seen for infected finger. Was put on medication. Was getting better but now nail has grown out and pus is under the nail and finger is red again. Does patient need to be seen?

## 2020-03-19 NOTE — TELEPHONE ENCOUNTER
Patients mother notified and has not tried the ointment. States the fingernail is group home detached and large pocket of pus under the fingernail and greenish. Finger is red again. Looks it needs to be punctured or removed.

## 2020-12-31 PROCEDURE — U0003 INFECTIOUS AGENT DETECTION BY NUCLEIC ACID (DNA OR RNA); SEVERE ACUTE RESPIRATORY SYNDROME CORONAVIRUS 2 (SARS-COV-2) (CORONAVIRUS DISEASE [COVID-19]), AMPLIFIED PROBE TECHNIQUE, MAKING USE OF HIGH THROUGHPUT TECHNOLOGIES AS DESCRIBED BY CMS-2020-01-R: HCPCS | Performed by: NURSE PRACTITIONER

## 2021-04-20 RX ORDER — NYSTATIN 100000 U/G
OINTMENT TOPICAL
Qty: 30 G | Refills: 0 | Status: SHIPPED | OUTPATIENT
Start: 2021-04-20

## 2021-06-23 ENCOUNTER — LAB (OUTPATIENT)
Dept: FAMILY MEDICINE CLINIC | Facility: CLINIC | Age: 36
End: 2021-06-23

## 2021-06-23 ENCOUNTER — OFFICE VISIT (OUTPATIENT)
Dept: FAMILY MEDICINE CLINIC | Facility: CLINIC | Age: 36
End: 2021-06-23

## 2021-06-23 VITALS
RESPIRATION RATE: 16 BRPM | BODY MASS INDEX: 30 KG/M2 | TEMPERATURE: 97.1 F | HEART RATE: 61 BPM | SYSTOLIC BLOOD PRESSURE: 129 MMHG | WEIGHT: 163 LBS | OXYGEN SATURATION: 98 % | HEIGHT: 62 IN | DIASTOLIC BLOOD PRESSURE: 56 MMHG

## 2021-06-23 DIAGNOSIS — Z00.00 PREVENTATIVE HEALTH CARE: Primary | ICD-10-CM

## 2021-06-23 PROBLEM — H92.01 RIGHT EAR PAIN: Status: RESOLVED | Noted: 2020-02-19 | Resolved: 2021-06-23

## 2021-06-23 PROBLEM — H10.31 ACUTE CONJUNCTIVITIS OF RIGHT EYE: Status: RESOLVED | Noted: 2019-07-12 | Resolved: 2021-06-23

## 2021-06-23 LAB
25(OH)D3 SERPL-MCNC: 41.1 NG/ML (ref 30–100)
ALBUMIN SERPL-MCNC: 4.6 G/DL (ref 3.5–5.2)
ALBUMIN/GLOB SERPL: 1.6 G/DL
ALP SERPL-CCNC: 69 U/L (ref 39–117)
ALT SERPL W P-5'-P-CCNC: 20 U/L (ref 1–33)
ANION GAP SERPL CALCULATED.3IONS-SCNC: 7.1 MMOL/L (ref 5–15)
AST SERPL-CCNC: 14 U/L (ref 1–32)
BASOPHILS # BLD AUTO: 0.09 10*3/MM3 (ref 0–0.2)
BASOPHILS NFR BLD AUTO: 1.6 % (ref 0–1.5)
BILIRUB SERPL-MCNC: 0.6 MG/DL (ref 0–1.2)
BUN SERPL-MCNC: 12 MG/DL (ref 6–20)
BUN/CREAT SERPL: 25.5 (ref 7–25)
CALCIUM SPEC-SCNC: 9.4 MG/DL (ref 8.6–10.5)
CHLORIDE SERPL-SCNC: 104 MMOL/L (ref 98–107)
CHOLEST SERPL-MCNC: 192 MG/DL (ref 0–200)
CO2 SERPL-SCNC: 26.9 MMOL/L (ref 22–29)
CREAT SERPL-MCNC: 0.47 MG/DL (ref 0.57–1)
DEPRECATED RDW RBC AUTO: 44.9 FL (ref 37–54)
EOSINOPHIL # BLD AUTO: 0.2 10*3/MM3 (ref 0–0.4)
EOSINOPHIL NFR BLD AUTO: 3.6 % (ref 0.3–6.2)
ERYTHROCYTE [DISTWIDTH] IN BLOOD BY AUTOMATED COUNT: 13.6 % (ref 12.3–15.4)
GFR SERPL CREATININE-BSD FRML MDRD: >150 ML/MIN/1.73
GLOBULIN UR ELPH-MCNC: 2.8 GM/DL
GLUCOSE SERPL-MCNC: 94 MG/DL (ref 65–99)
HCT VFR BLD AUTO: 40.6 % (ref 34–46.6)
HCV AB SER DONR QL: NORMAL
HDLC SERPL-MCNC: 52 MG/DL (ref 40–60)
HGB BLD-MCNC: 13.7 G/DL (ref 12–15.9)
IMM GRANULOCYTES # BLD AUTO: 0.02 10*3/MM3 (ref 0–0.05)
IMM GRANULOCYTES NFR BLD AUTO: 0.4 % (ref 0–0.5)
LDLC SERPL CALC-MCNC: 123 MG/DL (ref 0–100)
LDLC/HDLC SERPL: 2.34 {RATIO}
LYMPHOCYTES # BLD AUTO: 1.57 10*3/MM3 (ref 0.7–3.1)
LYMPHOCYTES NFR BLD AUTO: 28.5 % (ref 19.6–45.3)
MCH RBC QN AUTO: 30.1 PG (ref 26.6–33)
MCHC RBC AUTO-ENTMCNC: 33.7 G/DL (ref 31.5–35.7)
MCV RBC AUTO: 89.2 FL (ref 79–97)
MONOCYTES # BLD AUTO: 0.63 10*3/MM3 (ref 0.1–0.9)
MONOCYTES NFR BLD AUTO: 11.5 % (ref 5–12)
NEUTROPHILS NFR BLD AUTO: 2.99 10*3/MM3 (ref 1.7–7)
NEUTROPHILS NFR BLD AUTO: 54.4 % (ref 42.7–76)
NRBC BLD AUTO-RTO: 0 /100 WBC (ref 0–0.2)
PLATELET # BLD AUTO: 343 10*3/MM3 (ref 140–450)
PMV BLD AUTO: 11.2 FL (ref 6–12)
POTASSIUM SERPL-SCNC: 4.3 MMOL/L (ref 3.5–5.2)
PROT SERPL-MCNC: 7.4 G/DL (ref 6–8.5)
RBC # BLD AUTO: 4.55 10*6/MM3 (ref 3.77–5.28)
SODIUM SERPL-SCNC: 138 MMOL/L (ref 136–145)
TRIGL SERPL-MCNC: 91 MG/DL (ref 0–150)
TSH SERPL DL<=0.05 MIU/L-ACNC: 2.03 UIU/ML (ref 0.27–4.2)
VLDLC SERPL-MCNC: 17 MG/DL (ref 5–40)
WBC # BLD AUTO: 5.5 10*3/MM3 (ref 3.4–10.8)

## 2021-06-23 PROCEDURE — 80050 GENERAL HEALTH PANEL: CPT | Performed by: FAMILY MEDICINE

## 2021-06-23 PROCEDURE — 80061 LIPID PANEL: CPT | Performed by: FAMILY MEDICINE

## 2021-06-23 PROCEDURE — 86803 HEPATITIS C AB TEST: CPT | Performed by: FAMILY MEDICINE

## 2021-06-23 PROCEDURE — 82306 VITAMIN D 25 HYDROXY: CPT | Performed by: FAMILY MEDICINE

## 2021-06-23 PROCEDURE — 99395 PREV VISIT EST AGE 18-39: CPT | Performed by: FAMILY MEDICINE

## 2021-06-23 PROCEDURE — 36415 COLL VENOUS BLD VENIPUNCTURE: CPT | Performed by: FAMILY MEDICINE

## 2021-06-23 NOTE — PROGRESS NOTES
Subjective   Chief Complaint   Patient presents with   • Annual Exam     Vivian Portillo is a 35 y.o. female.     Patient Care Team:  Temi Christianson MD as PCP - General (Family Medicine)    She is coming in today with her mom for her annual wellness exam.  She is doing well and there are no new issues reported.  She lives with her parents, she has some developmental issues/delays due to cerebral palsy.  She stays physically active and she likes to take care of the animals they have at home, she likes to play on her iPad. Patient does not report any chest pain, shortness of breath, dizziness, nausea, vomiting, or diarrhea, visual issues, headaches, numbness or tingling. No urinary issues reported like urgency, frequency, or discomfort upon urination.  No significant weight changes reported.  No swelling reported.  No rashes or any other skin issues reported. No emotional issues or insomnia.         The following portions of the patient's history were reviewed and updated as appropriate: allergies, current medications, past family history, past medical history, past social history, past surgical history and problem list.  Past Medical History:   Diagnosis Date   • Anemia    • Astigmatism    • Cerebral palsy (CMS/HCC)    • Constipation    • Vitamin D deficiency      Past Surgical History:   Procedure Laterality Date   • DENTAL PROCEDURE      wisdom teeth   • EYE SURGERY      reattached muscles, cross eyed     The patient has a family history of  Family History   Problem Relation Age of Onset   • Hypotension Mother    • Hypertension Father    • Diabetes Maternal Grandmother    • Heart disease Maternal Grandmother    • Heart failure Maternal Grandmother    • Breast cancer Maternal Grandmother    • Cancer Maternal Grandmother    • Asthma Paternal Grandfather      Social History     Socioeconomic History   • Marital status: Single     Spouse name: Not on file   • Number of children: Not on file   • Years of education:  "Not on file   • Highest education level: Not on file   Tobacco Use   • Smoking status: Never Smoker   • Smokeless tobacco: Never Used   Substance and Sexual Activity   • Alcohol use: No   • Drug use: Defer   • Sexual activity: Defer       Review of Systems   Constitutional: Negative for activity change, appetite change, chills, fatigue, fever, unexpected weight gain and unexpected weight loss.   HENT: Negative for congestion, ear pain, hearing loss, nosebleeds, postnasal drip, swollen glands, tinnitus and trouble swallowing.    Eyes: Negative for blurred vision, double vision and visual disturbance.   Respiratory: Negative for cough, choking, chest tightness, shortness of breath, wheezing and stridor.    Cardiovascular: Negative for chest pain, palpitations and leg swelling.   Gastrointestinal: Positive for constipation. Negative for abdominal distention, abdominal pain, anal bleeding, diarrhea, nausea, vomiting and GERD.   Endocrine: Negative for cold intolerance, heat intolerance and polyphagia.   Genitourinary: Negative for dysuria, flank pain, frequency, hematuria and urgency.   Musculoskeletal: Negative for arthralgias, back pain, gait problem, joint swelling and neck pain.   Skin: Negative for color change, dry skin and skin lesions.   Allergic/Immunologic: Negative for environmental allergies and food allergies.   Neurological: Negative for dizziness, tremors, speech difficulty, light-headedness, numbness, headache and confusion.   Hematological: Negative for adenopathy. Does not bruise/bleed easily.   Psychiatric/Behavioral: Negative for decreased concentration, sleep disturbance, depressed mood and stress.     Visit Vitals  /56 (BP Location: Left arm, Patient Position: Sitting, Cuff Size: Adult)   Pulse 61   Temp 97.1 °F (36.2 °C) (Temporal)   Resp 16   Ht 157.5 cm (62\")   Wt 73.9 kg (163 lb)   SpO2 98%   BMI 29.81 kg/m²       Current Outpatient Medications:   •  ibuprofen (ADVIL,MOTRIN) 600 MG " tablet, Take 1 tablet by mouth Every 6 (Six) Hours As Needed for Mild Pain ., Disp: 30 tablet, Rfl: 0  •  nystatin (MYCOSTATIN) 804701 UNIT/GM ointment, APPLY TO APPROPRIATE AREA AS DIRECTED TWO TIMES A DAY, Disp: 30 g, Rfl: 0  •  Polyethylene Glycol 3350 (MIRALAX PO), MIRALAX POWD, Disp: , Rfl:   •  Vitamin D, Cholecalciferol, (CHOLECALCIFEROL) 400 units tablet, Take  by mouth Daily., Disp: , Rfl:     Objective   Physical Exam  Vitals and nursing note reviewed.   Constitutional:       General: She is not in acute distress.     Appearance: She is well-developed. She is not diaphoretic.   HENT:      Head: Normocephalic and atraumatic.      Right Ear: External ear normal.      Left Ear: External ear normal.   Eyes:      General: No scleral icterus.        Right eye: No discharge.         Left eye: No discharge.      Conjunctiva/sclera: Conjunctivae normal.      Pupils: Pupils are equal, round, and reactive to light.   Neck:      Thyroid: No thyromegaly.      Vascular: No JVD.   Cardiovascular:      Rate and Rhythm: Normal rate and regular rhythm.      Heart sounds: Normal heart sounds. No murmur heard.   No friction rub. No gallop.    Pulmonary:      Effort: Pulmonary effort is normal. No respiratory distress.      Breath sounds: Normal breath sounds. No stridor. No wheezing, rhonchi or rales.   Abdominal:      General: Bowel sounds are normal. There is no distension.      Palpations: Abdomen is soft. There is no mass.      Tenderness: There is no abdominal tenderness. There is no guarding or rebound.      Hernia: No hernia is present.   Musculoskeletal:         General: No swelling, tenderness or deformity. Normal range of motion.      Cervical back: Normal range of motion and neck supple. No muscular tenderness.      Right lower leg: No edema.      Left lower leg: No edema.   Lymphadenopathy:      Cervical: No cervical adenopathy.   Skin:     General: Skin is warm and dry.      Capillary Refill: Capillary refill  takes less than 2 seconds.      Coloration: Skin is not pale.      Findings: No bruising, erythema, lesion or rash.   Neurological:      General: No focal deficit present.      Mental Status: She is alert and oriented to person, place, and time.      Cranial Nerves: No cranial nerve deficit.      Sensory: No sensory deficit.      Motor: No weakness.      Coordination: Coordination normal.      Deep Tendon Reflexes: Reflexes normal.   Psychiatric:         Mood and Affect: Mood normal.         Behavior: Behavior normal.         Judgment: Judgment normal.           Assessment/Plan   Diagnoses and all orders for this visit:    1. Preventative health care (Primary)  -     Hepatitis C Antibody  -     CBC Auto Differential  -     Comprehensive Metabolic Panel  -     Lipid Panel  -     TSH  -     Vitamin D 25 Hydroxy      Wellness exam was done today - see above for details. Healthy life style was reviewed and discussed and re-enforced. Regular exercise and healthy diet were also discussed and recommended.  She has not gotten COVID-19 vaccination yet, this immunization was recommended today.  She typically gets flu shots annually.  She is not sexually active and never has been.          Return in about 1 year (around 6/23/2022) for Annual physical.    Requested Prescriptions      No prescriptions requested or ordered in this encounter